# Patient Record
Sex: FEMALE | Race: BLACK OR AFRICAN AMERICAN | NOT HISPANIC OR LATINO | Employment: UNEMPLOYED | URBAN - METROPOLITAN AREA
[De-identification: names, ages, dates, MRNs, and addresses within clinical notes are randomized per-mention and may not be internally consistent; named-entity substitution may affect disease eponyms.]

---

## 2024-08-27 PROBLEM — R23.4 BREAST SKIN CHANGES: Status: ACTIVE | Noted: 2024-08-27

## 2024-08-28 ENCOUNTER — OFFICE VISIT (OUTPATIENT)
Dept: SURGERY | Facility: CLINIC | Age: 74
End: 2024-08-28
Payer: MEDICARE

## 2024-08-28 VITALS — BODY MASS INDEX: 25.48 KG/M2 | WEIGHT: 182 LBS | HEIGHT: 71 IN

## 2024-08-28 DIAGNOSIS — L30.9 DERMATITIS: ICD-10-CM

## 2024-08-28 DIAGNOSIS — R23.4 BREAST SKIN CHANGES: Primary | ICD-10-CM

## 2024-08-28 PROCEDURE — 1126F AMNT PAIN NOTED NONE PRSNT: CPT | Mod: CPTII,S$GLB,, | Performed by: NURSE PRACTITIONER

## 2024-08-28 PROCEDURE — 4010F ACE/ARB THERAPY RXD/TAKEN: CPT | Mod: CPTII,S$GLB,, | Performed by: NURSE PRACTITIONER

## 2024-08-28 PROCEDURE — 1160F RVW MEDS BY RX/DR IN RCRD: CPT | Mod: CPTII,S$GLB,, | Performed by: NURSE PRACTITIONER

## 2024-08-28 PROCEDURE — 99203 OFFICE O/P NEW LOW 30 MIN: CPT | Mod: S$GLB,,, | Performed by: NURSE PRACTITIONER

## 2024-08-28 PROCEDURE — 1159F MED LIST DOCD IN RCRD: CPT | Mod: CPTII,S$GLB,, | Performed by: NURSE PRACTITIONER

## 2024-08-28 PROCEDURE — 3008F BODY MASS INDEX DOCD: CPT | Mod: CPTII,S$GLB,, | Performed by: NURSE PRACTITIONER

## 2024-08-28 PROCEDURE — 99999 PR PBB SHADOW E&M-EST. PATIENT-LVL III: CPT | Mod: PBBFAC,,, | Performed by: NURSE PRACTITIONER

## 2024-08-28 RX ORDER — PREDNISOLONE ACETATE 10 MG/ML
SUSPENSION/ DROPS OPHTHALMIC
COMMUNITY
Start: 2024-07-25

## 2024-08-28 RX ORDER — TRIAMCINOLONE ACETONIDE 1 MG/G
CREAM TOPICAL 2 TIMES DAILY
Qty: 28.4 G | Refills: 1 | Status: SHIPPED | OUTPATIENT
Start: 2024-08-28

## 2024-08-28 RX ORDER — MOMETASONE FUROATE 1 MG/G
OINTMENT TOPICAL DAILY
Status: CANCELLED | OUTPATIENT
Start: 2024-08-28

## 2024-08-28 RX ORDER — MOMETASONE FUROATE 1 MG/G
CREAM TOPICAL DAILY
Qty: 45 G | Refills: 1 | Status: SHIPPED | OUTPATIENT
Start: 2024-08-28

## 2024-08-28 RX ORDER — MINOCYCLINE HYDROCHLORIDE 50 MG/1
50 CAPSULE ORAL 2 TIMES DAILY
COMMUNITY
Start: 2024-08-21

## 2024-08-28 RX ORDER — METFORMIN HYDROCHLORIDE 500 MG/1
1 TABLET ORAL EVERY MORNING
COMMUNITY
Start: 2024-08-07

## 2024-08-28 NOTE — PROGRESS NOTES
[facility]  MD Sailaja Ryan, NP-C        Date of Service: 8/28/2024    Chief Complaint:   Jacki Groves is a 74 y.o. female presenting today due to a left breast rash that she first noticed two weeks ago it's in the same spot of a previous rash from 30 or 40 years ago. The spot never went away from the previous rash but it has since increased in size. It does itch from time to time.  She saw Dr. Palacios years ago for a rash that appears to be the same. He felt it was a cancer but her biopsy was negative according to her. She's concerned that it has returned after 30-40 years and reports today for further recommendations.     History of Present Illness:   Mrs. Jacki Groves presents on August 28,2024 due to a left breast rash.  MD::: Marlen Pearson MD    Past Medical History:   Diagnosis Date    Breast skin changes 08/27/2024    Diabetes     Gout     Hypertension       Past Surgical History:   Procedure Laterality Date    CARPAL TUNNEL RELEASE      cataract removal bilateral      HYSTERECTOMY  06/29/1998    for fibroids, DUB--SHIRA-BSO    left breast excisional biopsy 1982      PARATHYROIDECTOMY  2015    3 1/2 gland, for hyperparathyroidism--1-4 glands removed    THYROIDECTOMY          Current Outpatient Medications:     allopurinoL (ZYLOPRIM) 100 MG tablet, TAKE 1 TABLET BY MOUTH IN THE MORNING FOR GOUT, Disp: , Rfl:     amLODIPine (NORVASC) 10 MG tablet, amlodipine 10 mg tablet, Disp: , Rfl:     aspirin (ECOTRIN) 81 MG EC tablet, Take 81 mg by mouth., Disp: , Rfl:     hydroCHLOROthiazide (MICROZIDE) 12.5 mg capsule, Take 1 capsule by mouth once daily., Disp: , Rfl:     metFORMIN (GLUCOPHAGE) 500 MG tablet, Take 1 tablet by mouth every morning., Disp: , Rfl:     metoprolol tartrate (LOPRESSOR) 25 MG tablet, metoprolol tartrate 25 mg tablet, Disp: , Rfl:     minocycline (MINOCIN,DYNACIN) 50 MG Cap, Take 50 mg by mouth 2 (two) times daily., Disp:  , Rfl:     olmesartan (BENICAR) 40 MG tablet, Take 40 mg by mouth once daily., Disp: , Rfl:     potassium chloride (MICRO-K) 10 MEQ CpSR, Take 1 capsule by mouth once daily., Disp: , Rfl:     prednisoLONE acetate (PRED FORTE) 1 % DrpS, INSTILL ONE DROP INTO BOTH EYES FOUR TIMES DAILY, Disp: , Rfl:     rosuvastatin (CRESTOR) 5 MG tablet, Take 5 mg by mouth once daily., Disp: , Rfl:     TRADJENTA 5 mg Tab tablet, Take 5 mg by mouth every morning., Disp: , Rfl:     vitamin D (VITAMIN D3) 1000 units Tab, Take 1,000 mg by mouth., Disp: , Rfl:     mometasone 0.1% (ELOCON) 0.1 % cream, Apply topically once daily., Disp: 45 g, Rfl: 1    triamcinolone acetonide 0.1% (KENALOG) 0.1 % cream, Apply topically 2 (two) times daily., Disp: 28.4 g, Rfl: 1   Review of patient's allergies indicates:   Allergen Reactions    Gabapentin Other (See Comments)     Other reaction(s): Not Indicated    Statins-hmg-coa reductase inhibitors Other (See Comments)     Leg pain      Social History     Tobacco Use    Smoking status: Never    Smokeless tobacco: Never   Substance Use Topics    Alcohol use: Yes     Comment: occ      Family History   Problem Relation Name Age of Onset    Hypertension Mother      Hypertension Father      Diabetes Father      Stroke Sister      Breast cancer Neg Hx      Ovarian cancer Neg Hx          Review of Systems   Integumentary:  Positive for rash. Negative for color change, mole/lesion, breast mass, breast discharge and breast tenderness.   Breast: Negative for mass and tenderness       Physical Exam   Constitutional: She appears well-developed. She is cooperative.   HENT:   Head: Normocephalic.   Cardiovascular:  Normal rate and regular rhythm.            Pulmonary/Chest: She exhibits no tenderness and no bony tenderness. Right breast exhibits no mass, no nipple discharge, no skin change and no tenderness. Left breast exhibits no mass, no nipple discharge, no skin change (dry brown patch noted in the UIQ and UOQ)  and no tenderness.       Abdominal: Soft. Normal appearance.   Musculoskeletal: Lymphadenopathy:      Upper Body:      Right upper body: No supraclavicular or axillary adenopathy.      Left upper body: No supraclavicular or axillary adenopathy.     Neurological: She is alert.   Skin: No rash noted.                Mammogram: Screening 3/26/2024- The breasts are heterogeneously dense, which may obscure small masses. There is no evidence of suspicious masses, calcifications, or other abnormal findings.  Benign left breast postoperative architectural distortion and benign bilateral breast calcifications.There is no mammographic evidence of malignancy.              ASSESSMENT and PLAN OF CARE     1. Breast skin changes  Assessment & Plan:  Her AOC is consistent with  a patch of eczema/dermatitis. I will start her on Triamcinolone and Elocon and re-evaluate her. If her symptoms don't resolve I will obtain a punch biopsy and she will follow up with her Dermatologist. She understands and agrees with this plan.         2. Dermatitis  Assessment & Plan:  Same as above        Other orders  -     mometasone 0.1% (ELOCON) 0.1 % cream; Apply topically once daily.  Dispense: 45 g; Refill: 1  -     triamcinolone acetonide 0.1% (KENALOG) 0.1 % cream; Apply topically 2 (two) times daily.  Dispense: 28.4 g; Refill: 1    Medical Decision Making: It is my impression that this patient suffers all conditions contained in this medical document.  Each of these conditions did affect our plan of care and my medical decision making today.  It is my opinion that the medical decision making concerning this patient was of moderate difficulty based on the aforementioned conditions.  Any further recommendations will be communicated to the patient by me.  I have reviewed and verified her allergies, list of medications, medical and surgical histories, social history, and a pertinent review of symptoms.    Follow up:  two weeks and PRN    For:  PE

## 2024-08-28 NOTE — ASSESSMENT & PLAN NOTE
Her AOC is consistent with  a patch of eczema/dermatitis. I will start her on Triamcinolone and Elocon and re-evaluate her. If her symptoms don't resolve I will obtain a punch biopsy and she will follow up with her Dermatologist. She understands and agrees with this plan.

## 2024-12-02 ENCOUNTER — OFFICE VISIT (OUTPATIENT)
Dept: SURGERY | Facility: CLINIC | Age: 74
End: 2024-12-02
Payer: MEDICARE

## 2024-12-02 DIAGNOSIS — R23.4 BREAST SKIN CHANGES: Primary | ICD-10-CM

## 2024-12-02 PROCEDURE — 1160F RVW MEDS BY RX/DR IN RCRD: CPT | Mod: CPTII,S$GLB,, | Performed by: NURSE PRACTITIONER

## 2024-12-02 PROCEDURE — 3061F NEG MICROALBUMINURIA REV: CPT | Mod: CPTII,S$GLB,, | Performed by: NURSE PRACTITIONER

## 2024-12-02 PROCEDURE — 99999 PR PBB SHADOW E&M-EST. PATIENT-LVL III: CPT | Mod: PBBFAC,,, | Performed by: NURSE PRACTITIONER

## 2024-12-02 PROCEDURE — 4010F ACE/ARB THERAPY RXD/TAKEN: CPT | Mod: CPTII,S$GLB,, | Performed by: NURSE PRACTITIONER

## 2024-12-02 PROCEDURE — 1159F MED LIST DOCD IN RCRD: CPT | Mod: CPTII,S$GLB,, | Performed by: NURSE PRACTITIONER

## 2024-12-02 PROCEDURE — 3044F HG A1C LEVEL LT 7.0%: CPT | Mod: CPTII,S$GLB,, | Performed by: NURSE PRACTITIONER

## 2024-12-02 PROCEDURE — 1126F AMNT PAIN NOTED NONE PRSNT: CPT | Mod: CPTII,S$GLB,, | Performed by: NURSE PRACTITIONER

## 2024-12-02 PROCEDURE — 3066F NEPHROPATHY DOC TX: CPT | Mod: CPTII,S$GLB,, | Performed by: NURSE PRACTITIONER

## 2024-12-02 PROCEDURE — 99212 OFFICE O/P EST SF 10 MIN: CPT | Mod: S$GLB,,, | Performed by: NURSE PRACTITIONER

## 2024-12-02 NOTE — PROGRESS NOTES
[facility]  MD Sailaja Ryan, NP-C    Date of Service: 12/2/2024      Chief Complaint:   Jacki Groves is a 74 y.o. female presenting today for follow up on her left breast rash. Her symptoms have resolved.     History of Present Illness:   Mrs. Jacki Groves first presented on August 28,2024 due to a  benign appearing left breast rash. She was treated conservatively with Elocon and triamcinolone cream.  MD::: Marlen Pearson MD     Past Medical History:   Diagnosis Date    Breast skin changes 08/27/2024    Diabetes     Gout     Hypertension       Past Surgical History:   Procedure Laterality Date    CARPAL TUNNEL RELEASE      cataract removal bilateral      HYSTERECTOMY  06/29/1998    for fibroids, DUB--SHIRA-BSO    left breast excisional biopsy 1982      PARATHYROIDECTOMY  2015    3 1/2 gland, for hyperparathyroidism--1-4 glands removed    THYROIDECTOMY          Current Outpatient Medications:     allopurinoL (ZYLOPRIM) 100 MG tablet, TAKE 1 TABLET BY MOUTH IN THE MORNING FOR GOUT, Disp: , Rfl:     amLODIPine (NORVASC) 10 MG tablet, amlodipine 10 mg tablet, Disp: , Rfl:     aspirin (ECOTRIN) 81 MG EC tablet, Take 81 mg by mouth., Disp: , Rfl:     hydroCHLOROthiazide (MICROZIDE) 12.5 mg capsule, Take 1 capsule by mouth once daily., Disp: , Rfl:     metFORMIN (GLUCOPHAGE) 500 MG tablet, Take 1 tablet by mouth every morning., Disp: , Rfl:     metoprolol tartrate (LOPRESSOR) 25 MG tablet, metoprolol tartrate 25 mg tablet, Disp: , Rfl:     minocycline (MINOCIN,DYNACIN) 50 MG Cap, Take 50 mg by mouth 2 (two) times daily., Disp: , Rfl:     mometasone 0.1% (ELOCON) 0.1 % cream, Apply topically once daily., Disp: 45 g, Rfl: 1    olmesartan (BENICAR) 40 MG tablet, Take 40 mg by mouth once daily., Disp: , Rfl:     potassium chloride (MICRO-K) 10 MEQ CpSR, Take 1 capsule by mouth once daily., Disp: , Rfl:     prednisoLONE acetate (PRED FORTE) 1 % DrpS,  INSTILL ONE DROP INTO BOTH EYES FOUR TIMES DAILY, Disp: , Rfl:     rosuvastatin (CRESTOR) 5 MG tablet, Take 5 mg by mouth once daily., Disp: , Rfl:     TRADJENTA 5 mg Tab tablet, Take 5 mg by mouth every morning., Disp: , Rfl:     triamcinolone acetonide 0.1% (KENALOG) 0.1 % cream, Apply topically 2 (two) times daily., Disp: 28.4 g, Rfl: 1    vitamin D (VITAMIN D3) 1000 units Tab, Take 1,000 mg by mouth., Disp: , Rfl:    Review of patient's allergies indicates:   Allergen Reactions    Gabapentin Other (See Comments)     Other reaction(s): Not Indicated    Statins-hmg-coa reductase inhibitors Other (See Comments)     Leg pain      Social History     Tobacco Use    Smoking status: Never    Smokeless tobacco: Never   Substance Use Topics    Alcohol use: Yes     Comment: occ      Family History   Problem Relation Name Age of Onset    Hypertension Mother      Hypertension Father      Diabetes Father      Stroke Sister      Breast cancer Neg Hx      Ovarian cancer Neg Hx          Review of Systems   Integumentary:  Negative for color change, rash, mole/lesion, breast mass, breast discharge and breast tenderness.   Breast: Negative for mass and tenderness       Physical Exam   HENT:   Head: Normocephalic.   Pulmonary/Chest: Right breast exhibits no inverted nipple, no mass, no nipple discharge, no skin change and no tenderness. Left breast exhibits no inverted nipple, no mass, no nipple discharge, no skin change and no tenderness. No breast swelling.   Genitourinary: No breast swelling.   Musculoskeletal: Lymphadenopathy:      Upper Body:      Right upper body: No supraclavicular or axillary adenopathy.      Left upper body: No supraclavicular or axillary adenopathy.     Neurological: She is alert.          Assessment/Plan  1. Breast skin changes  Assessment & Plan:  Her AOC was  consistent with  a patch of eczema/dermatitis. She was treated with  Triamcinolone and Elocon and her symptoms have resolved.             Medical  Decision Making:  It is my impression that this patient suffers all conditions contained in this medical document.  Each of these conditions did affect our plan of care and my medical decision making today.  It is my opinion that the medical decision making concerning this patient was of moderate difficulty based on the aforementioned conditions.  Any further recommendations will be communicated to the patient by me.  I have reviewed and verified her allergies, list of medications, medical and surgical histories, social history, and a pertinent review of symptoms.      Follow up:  March 2025 and PRN    For:  Physical Examination and MGM (S) at [facility]

## 2024-12-02 NOTE — ASSESSMENT & PLAN NOTE
Her AOC was  consistent with  a patch of eczema/dermatitis. She was treated with  Triamcinolone and Elocon and her symptoms have resolved.

## 2025-03-25 ENCOUNTER — TELEPHONE (OUTPATIENT)
Dept: SURGERY | Facility: CLINIC | Age: 75
End: 2025-03-25
Payer: MEDICARE

## 2025-03-25 NOTE — TELEPHONE ENCOUNTER
Called pt to discuss cancellation of her upcoming appt with Kelsea Bryan due to her last day here with C was 3/13/2025. Pt voiced that she will follow up with her GYN. Pt v/u that her appt is now cancelled.